# Patient Record
Sex: MALE | Race: WHITE | NOT HISPANIC OR LATINO | Employment: FULL TIME | URBAN - METROPOLITAN AREA
[De-identification: names, ages, dates, MRNs, and addresses within clinical notes are randomized per-mention and may not be internally consistent; named-entity substitution may affect disease eponyms.]

---

## 2022-04-09 ENCOUNTER — HISTORICAL (OUTPATIENT)
Dept: ADMINISTRATIVE | Facility: HOSPITAL | Age: 52
End: 2022-04-09

## 2022-04-26 VITALS
SYSTOLIC BLOOD PRESSURE: 140 MMHG | BODY MASS INDEX: 31.21 KG/M2 | WEIGHT: 210.75 LBS | DIASTOLIC BLOOD PRESSURE: 83 MMHG | HEIGHT: 69 IN | OXYGEN SATURATION: 96 %

## 2022-05-26 ENCOUNTER — OFFICE VISIT (OUTPATIENT)
Dept: URGENT CARE | Facility: CLINIC | Age: 52
End: 2022-05-26
Payer: COMMERCIAL

## 2022-05-26 VITALS
SYSTOLIC BLOOD PRESSURE: 141 MMHG | DIASTOLIC BLOOD PRESSURE: 87 MMHG | TEMPERATURE: 98 F | BODY MASS INDEX: 32.62 KG/M2 | RESPIRATION RATE: 20 BRPM | OXYGEN SATURATION: 98 % | HEIGHT: 66 IN | HEART RATE: 85 BPM | WEIGHT: 203 LBS

## 2022-05-26 DIAGNOSIS — T16.2XXA FOREIGN BODY OF LEFT EAR, INITIAL ENCOUNTER: ICD-10-CM

## 2022-05-26 PROCEDURE — 99202 OFFICE O/P NEW SF 15 MIN: CPT | Mod: ,,, | Performed by: NURSE PRACTITIONER

## 2022-05-26 PROCEDURE — 99202 PR OFFICE/OUTPT VISIT, NEW, LEVL II, 15-29 MIN: ICD-10-PCS | Mod: ,,, | Performed by: NURSE PRACTITIONER

## 2022-05-26 PROCEDURE — 3079F PR MOST RECENT DIASTOLIC BLOOD PRESSURE 80-89 MM HG: ICD-10-PCS | Mod: CPTII,,, | Performed by: NURSE PRACTITIONER

## 2022-05-26 PROCEDURE — 3079F DIAST BP 80-89 MM HG: CPT | Mod: CPTII,,, | Performed by: NURSE PRACTITIONER

## 2022-05-26 PROCEDURE — 1159F MED LIST DOCD IN RCRD: CPT | Mod: CPTII,,, | Performed by: NURSE PRACTITIONER

## 2022-05-26 PROCEDURE — 1160F PR REVIEW ALL MEDS BY PRESCRIBER/CLIN PHARMACIST DOCUMENTED: ICD-10-PCS | Mod: CPTII,,, | Performed by: NURSE PRACTITIONER

## 2022-05-26 PROCEDURE — 1160F RVW MEDS BY RX/DR IN RCRD: CPT | Mod: CPTII,,, | Performed by: NURSE PRACTITIONER

## 2022-05-26 PROCEDURE — 3008F BODY MASS INDEX DOCD: CPT | Mod: CPTII,,, | Performed by: NURSE PRACTITIONER

## 2022-05-26 PROCEDURE — 3008F PR BODY MASS INDEX (BMI) DOCUMENTED: ICD-10-PCS | Mod: CPTII,,, | Performed by: NURSE PRACTITIONER

## 2022-05-26 PROCEDURE — 3077F SYST BP >= 140 MM HG: CPT | Mod: CPTII,,, | Performed by: NURSE PRACTITIONER

## 2022-05-26 PROCEDURE — 3077F PR MOST RECENT SYSTOLIC BLOOD PRESSURE >= 140 MM HG: ICD-10-PCS | Mod: CPTII,,, | Performed by: NURSE PRACTITIONER

## 2022-05-26 PROCEDURE — 1159F PR MEDICATION LIST DOCUMENTED IN MEDICAL RECORD: ICD-10-PCS | Mod: CPTII,,, | Performed by: NURSE PRACTITIONER

## 2023-06-12 ENCOUNTER — OFFICE VISIT (OUTPATIENT)
Dept: URGENT CARE | Facility: CLINIC | Age: 53
End: 2023-06-12
Payer: COMMERCIAL

## 2023-06-12 VITALS
HEIGHT: 66 IN | DIASTOLIC BLOOD PRESSURE: 79 MMHG | TEMPERATURE: 99 F | BODY MASS INDEX: 31.82 KG/M2 | SYSTOLIC BLOOD PRESSURE: 129 MMHG | HEART RATE: 85 BPM | OXYGEN SATURATION: 96 % | WEIGHT: 198 LBS | RESPIRATION RATE: 18 BRPM

## 2023-06-12 DIAGNOSIS — J02.9 SORE THROAT: Primary | ICD-10-CM

## 2023-06-12 DIAGNOSIS — J10.1 INFLUENZA A: ICD-10-CM

## 2023-06-12 DIAGNOSIS — R50.9 FEVER, UNSPECIFIED FEVER CAUSE: ICD-10-CM

## 2023-06-12 LAB
CTP QC/QA: YES
MOLECULAR STREP A: NEGATIVE
POC MOLECULAR INFLUENZA A AGN: POSITIVE
POC MOLECULAR INFLUENZA B AGN: NEGATIVE
SARS-COV-2 RDRP RESP QL NAA+PROBE: NEGATIVE

## 2023-06-12 PROCEDURE — 99213 PR OFFICE/OUTPT VISIT, EST, LEVL III, 20-29 MIN: ICD-10-PCS | Mod: ,,,

## 2023-06-12 PROCEDURE — 99213 OFFICE O/P EST LOW 20 MIN: CPT | Mod: ,,,

## 2023-06-12 PROCEDURE — 87502 INFLUENZA DNA AMP PROBE: CPT | Mod: QW,,,

## 2023-06-12 PROCEDURE — 87635 SARS-COV-2 COVID-19 AMP PRB: CPT | Mod: QW,,,

## 2023-06-12 PROCEDURE — 87651 STREP A DNA AMP PROBE: CPT | Mod: QW,,,

## 2023-06-12 PROCEDURE — 87502 POCT INFLUENZA A/B MOLECULAR: ICD-10-PCS | Mod: QW,,,

## 2023-06-12 PROCEDURE — 87651 POCT STREP A MOLECULAR: ICD-10-PCS | Mod: QW,,,

## 2023-06-12 PROCEDURE — 87635: ICD-10-PCS | Mod: QW,,,

## 2023-06-12 RX ORDER — BALOXAVIR MARBOXIL 80 MG/1
80 TABLET, FILM COATED ORAL ONCE
Qty: 1 TABLET | Refills: 0 | Status: SHIPPED | OUTPATIENT
Start: 2023-06-12 | End: 2023-06-12

## 2023-06-12 NOTE — PATIENT INSTRUCTIONS
Prescriptions printed  Increase fluid intake. Monitor for fever. Take tylenol/acetaminophen or advil/ibuprofen as needed for headache, bodyaches or fever.   Treat your symptoms like the common cold, take Delysm/dimetapp/robitussin as needed for cough, claritin, flonase, mucinex for congestion, for example.   Complications for flu include pneumonia, bronchitis, and sinusitis.   Stay home for 5 to 7 days total starting from when your symptoms began.  If your symptoms worsen, or you develop shortness of breath, worsening of cough, or fever over 102.5, seek medical attention immediately.

## 2023-06-12 NOTE — LETTER
June 12, 2023      [facility]  [address], [address]  [place] [address]  Phone: [phone]  Fax: [phone]       Patient: Terry Machado   YOB: 1970  Date of Visit: 06/12/2023    To Whom It May Concern:    Iraida Machado  was at [facility] on 06/12/2023. The patient may return to work/school on 06/19/2023 with no restrictions. If you have any questions or concerns, or if I can be of further assistance, please do not hesitate to contact me.    Sincerely,    Shanice Bearden, RT

## 2023-06-12 NOTE — PROGRESS NOTES
"Subjective:      Patient ID: Terry Machado is a 52 y.o. male.    Vitals:  height is 5' 6" (1.676 m) and weight is 89.8 kg (198 lb). His temperature is 98.5 °F (36.9 °C). His blood pressure is 129/79 and his pulse is 85. His respiration is 18 and oxygen saturation is 96%.     Chief Complaint: Sore Throat (Sore throat, dry cough, fever (101.7), x since yesterday)    A 51 y/o male presents to the clinic with c/o sudden onset of fatigue, sore throat, dry cough, body aches, fever tmax 101.7, and chills x 2 days. He denies any hx of asthma, wheezing, sob, cp, n/v/d, abdominal complaints, rash, difficulty swallowing, neck stiffness, or changes in intake or output.       Sore Throat   Associated symptoms include congestion and coughing. Pertinent negatives include no shortness of breath or trouble swallowing.     Constitution: Positive for fatigue and fever.   HENT:  Positive for congestion, postnasal drip and sore throat. Negative for trouble swallowing and voice change.    Eyes: Negative.    Respiratory:  Positive for cough. Negative for sputum production, shortness of breath, wheezing and asthma.    Gastrointestinal: Negative.    Allergic/Immunologic: Negative for asthma.    Objective:     Physical Exam   Constitutional: He is oriented to person, place, and time. He appears well-developed. He is cooperative.  Non-toxic appearance. He does not appear ill. No distress.   HENT:   Head: Normocephalic and atraumatic.   Ears:   Right Ear: Hearing, tympanic membrane and external ear normal.   Left Ear: Hearing, tympanic membrane and external ear normal.   Nose: Congestion present.   Mouth/Throat: Mucous membranes are normal. Mucous membranes are moist. Posterior oropharyngeal erythema present. Oropharynx is clear.   Eyes: Conjunctivae and lids are normal.   Neck: Trachea normal and phonation normal. Neck supple. No edema present. No erythema present. No neck rigidity present.   Cardiovascular: Normal rate, regular rhythm and " "normal heart sounds.   Pulmonary/Chest: Effort normal and breath sounds normal. No stridor. No respiratory distress. He has no decreased breath sounds. He has no wheezes. He has no rhonchi. He has no rales.   Abdominal: Normal appearance.   Neurological: He is alert and oriented to person, place, and time. He exhibits normal muscle tone.   Skin: Skin is warm, intact and not diaphoretic. Capillary refill takes less than 2 seconds.   Psychiatric: His speech is normal and behavior is normal. Mood normal.   Nursing note and vitals reviewed.       Previous History      Review of patient's allergies indicates:  No Known Allergies    Past Medical History:   Diagnosis Date    Unspecified sensorineural hearing loss      Current Outpatient Medications   Medication Instructions    XOFLUZA 80 mg, Oral, Once     Past Surgical History:   Procedure Laterality Date    FOOT SURGERY       Family History   Problem Relation Age of Onset    Diabetes Mellitus Mother     Diabetes type II Father        Social History     Tobacco Use    Smoking status: Never    Smokeless tobacco: Former   Substance Use Topics    Alcohol use: Yes     Comment: occas    Drug use: Never        Physical Exam      Vital Signs Reviewed   /79   Pulse 85   Temp 98.5 °F (36.9 °C)   Resp 18   Ht 5' 6" (1.676 m)   Wt 89.8 kg (198 lb)   SpO2 96%   BMI 31.96 kg/m²        Procedures    Procedures     Labs     Results for orders placed or performed in visit on 06/12/23   POCT Strep A, Molecular   Result Value Ref Range    Molecular Strep A, POC Negative Negative     Acceptable Yes    POCT COVID-19 Rapid Screening   Result Value Ref Range    POC Rapid COVID Negative Negative     Acceptable Yes    POCT Influenza A/B Molecular   Result Value Ref Range    POC Molecular Influenza A Ag Positive (A) Negative, Not Reported    POC Molecular Influenza B Ag Negative Negative, Not Reported     Acceptable Yes        Assessment: "     1. Sore throat    2. Fever, unspecified fever cause    3. Influenza A        Plan:       Sore throat  -     POCT Strep A, Molecular  -     POCT COVID-19 Rapid Screening  -     POCT Influenza A/B Molecular    Fever, unspecified fever cause  -     POCT Strep A, Molecular  -     POCT COVID-19 Rapid Screening  -     POCT Influenza A/B Molecular    Influenza A    Other orders  -     baloxavir marboxiL (XOFLUZA) 80 mg tablet; Take 1 tablet (80 mg total) by mouth once. for 1 dose  Dispense: 1 tablet; Refill: 0      Prescriptions printed  Increase fluid intake. Monitor for fever. Take tylenol/acetaminophen or advil/ibuprofen as needed for headache, bodyaches or fever.   Treat your symptoms like the common cold, take Delysm/dimetapp/robitussin as needed for cough, claritin, flonase, mucinex for congestion, for example.   Complications for flu include pneumonia, bronchitis, and sinusitis.   Stay home for 5 to 7 days total starting from when your symptoms began.  If your symptoms worsen, or you develop shortness of breath, worsening of cough, or fever over 102.5, seek medical attention immediately.

## 2023-08-30 ENCOUNTER — HOSPITAL ENCOUNTER (OUTPATIENT)
Facility: HOSPITAL | Age: 53
Discharge: HOME | End: 2023-08-31
Attending: EMERGENCY MEDICINE | Admitting: INTERNAL MEDICINE
Payer: COMMERCIAL

## 2023-08-30 DIAGNOSIS — R07.9 CHEST PAIN WITH MODERATE RISK OF ACUTE CORONARY SYNDROME: ICD-10-CM

## 2023-08-30 DIAGNOSIS — R07.9 CHEST PAIN: ICD-10-CM

## 2023-08-30 LAB
ALBUMIN SERPL-MCNC: 4.4 G/DL (ref 3.5–5)
ALBUMIN/GLOB SERPL: 1.4 RATIO (ref 1.1–2)
ALP SERPL-CCNC: 61 UNIT/L (ref 40–150)
ALT SERPL-CCNC: 41 UNIT/L (ref 0–55)
APPEARANCE UR: ABNORMAL
AST SERPL-CCNC: 26 UNIT/L (ref 5–34)
BACTERIA #/AREA URNS AUTO: NORMAL /HPF
BASOPHILS # BLD AUTO: 0.05 X10(3)/MCL
BASOPHILS NFR BLD AUTO: 0.6 %
BILIRUB SERPL-MCNC: 0.5 MG/DL
BILIRUB UR QL STRIP.AUTO: NEGATIVE
BUN SERPL-MCNC: 16.8 MG/DL (ref 8.4–25.7)
CALCIUM SERPL-MCNC: 9.6 MG/DL (ref 8.4–10.2)
CHLORIDE SERPL-SCNC: 108 MMOL/L (ref 98–107)
CO2 SERPL-SCNC: 24 MMOL/L (ref 22–29)
COLOR UR: ABNORMAL
CREAT SERPL-MCNC: 0.85 MG/DL (ref 0.73–1.18)
EOSINOPHIL # BLD AUTO: 0.26 X10(3)/MCL (ref 0–0.9)
EOSINOPHIL NFR BLD AUTO: 2.9 %
ERYTHROCYTE [DISTWIDTH] IN BLOOD BY AUTOMATED COUNT: 12.9 % (ref 11.5–17)
FLUAV AG UPPER RESP QL IA.RAPID: NOT DETECTED
FLUBV AG UPPER RESP QL IA.RAPID: NOT DETECTED
GFR SERPLBLD CREATININE-BSD FMLA CKD-EPI: >60 MLS/MIN/1.73/M2
GLOBULIN SER-MCNC: 3.2 GM/DL (ref 2.4–3.5)
GLUCOSE SERPL-MCNC: 105 MG/DL (ref 74–100)
GLUCOSE UR QL STRIP.AUTO: NEGATIVE
HCT VFR BLD AUTO: 42.4 % (ref 42–52)
HGB BLD-MCNC: 14.7 G/DL (ref 14–18)
IMM GRANULOCYTES # BLD AUTO: 0.02 X10(3)/MCL (ref 0–0.04)
IMM GRANULOCYTES NFR BLD AUTO: 0.2 %
INR PPP: 1
KETONES UR QL STRIP.AUTO: NEGATIVE
LEUKOCYTE ESTERASE UR QL STRIP.AUTO: NEGATIVE
LIPASE SERPL-CCNC: 24 U/L
LYMPHOCYTES # BLD AUTO: 3.09 X10(3)/MCL (ref 0.6–4.6)
LYMPHOCYTES NFR BLD AUTO: 34.1 %
MCH RBC QN AUTO: 31.3 PG (ref 27–31)
MCHC RBC AUTO-ENTMCNC: 34.7 G/DL (ref 33–36)
MCV RBC AUTO: 90.2 FL (ref 80–94)
MONOCYTES # BLD AUTO: 0.67 X10(3)/MCL (ref 0.1–1.3)
MONOCYTES NFR BLD AUTO: 7.4 %
NEUTROPHILS # BLD AUTO: 4.98 X10(3)/MCL (ref 2.1–9.2)
NEUTROPHILS NFR BLD AUTO: 54.8 %
NITRITE UR QL STRIP.AUTO: NEGATIVE
NRBC BLD AUTO-RTO: 0 %
PH UR STRIP.AUTO: 7.5 [PH]
PLATELET # BLD AUTO: 295 X10(3)/MCL (ref 130–400)
PMV BLD AUTO: 10.2 FL (ref 7.4–10.4)
POTASSIUM SERPL-SCNC: 3.9 MMOL/L (ref 3.5–5.1)
PROT SERPL-MCNC: 7.6 GM/DL (ref 6.4–8.3)
PROT UR QL STRIP.AUTO: NEGATIVE
PROTHROMBIN TIME: 13.4 SECONDS (ref 12.5–14.5)
RBC # BLD AUTO: 4.7 X10(6)/MCL (ref 4.7–6.1)
RBC #/AREA URNS AUTO: <5 /HPF
RBC UR QL AUTO: NEGATIVE
SARS-COV-2 RNA RESP QL NAA+PROBE: NOT DETECTED
SODIUM SERPL-SCNC: 141 MMOL/L (ref 136–145)
SP GR UR STRIP.AUTO: 1.02 (ref 1–1.03)
SQUAMOUS #/AREA URNS AUTO: <5 /HPF
TROPONIN I SERPL-MCNC: <0.01 NG/ML (ref 0–0.04)
UROBILINOGEN UR STRIP-ACNC: 1
WBC # SPEC AUTO: 9.07 X10(3)/MCL (ref 4.5–11.5)
WBC #/AREA URNS AUTO: <5 /HPF

## 2023-08-30 PROCEDURE — 85610 PROTHROMBIN TIME: CPT

## 2023-08-30 PROCEDURE — 93005 ELECTROCARDIOGRAM TRACING: CPT

## 2023-08-30 PROCEDURE — 93010 ELECTROCARDIOGRAM REPORT: CPT | Mod: ,,, | Performed by: INTERNAL MEDICINE

## 2023-08-30 PROCEDURE — 80053 COMPREHEN METABOLIC PANEL: CPT

## 2023-08-30 PROCEDURE — 25000003 PHARM REV CODE 250: Performed by: EMERGENCY MEDICINE

## 2023-08-30 PROCEDURE — 85025 COMPLETE CBC W/AUTO DIFF WBC: CPT

## 2023-08-30 PROCEDURE — 84484 ASSAY OF TROPONIN QUANT: CPT

## 2023-08-30 PROCEDURE — 0240U COVID/FLU A&B PCR: CPT

## 2023-08-30 PROCEDURE — 84484 ASSAY OF TROPONIN QUANT: CPT | Mod: 91 | Performed by: EMERGENCY MEDICINE

## 2023-08-30 PROCEDURE — 83690 ASSAY OF LIPASE: CPT

## 2023-08-30 PROCEDURE — 63600175 PHARM REV CODE 636 W HCPCS: Performed by: EMERGENCY MEDICINE

## 2023-08-30 PROCEDURE — C9113 INJ PANTOPRAZOLE SODIUM, VIA: HCPCS | Performed by: EMERGENCY MEDICINE

## 2023-08-30 PROCEDURE — 93010 EKG 12-LEAD: ICD-10-PCS | Mod: ,,, | Performed by: INTERNAL MEDICINE

## 2023-08-30 PROCEDURE — 81001 URINALYSIS AUTO W/SCOPE: CPT

## 2023-08-30 PROCEDURE — 99285 EMERGENCY DEPT VISIT HI MDM: CPT | Mod: 25

## 2023-08-30 PROCEDURE — 96374 THER/PROPH/DIAG INJ IV PUSH: CPT

## 2023-08-30 RX ORDER — ONDANSETRON 2 MG/ML
4 INJECTION INTRAMUSCULAR; INTRAVENOUS EVERY 8 HOURS PRN
Status: DISCONTINUED | OUTPATIENT
Start: 2023-08-31 | End: 2023-08-31 | Stop reason: HOSPADM

## 2023-08-30 RX ORDER — PANTOPRAZOLE SODIUM 40 MG/10ML
80 INJECTION, POWDER, LYOPHILIZED, FOR SOLUTION INTRAVENOUS
Status: COMPLETED | OUTPATIENT
Start: 2023-08-30 | End: 2023-08-30

## 2023-08-30 RX ORDER — SUCRALFATE 1 G/1
1 TABLET ORAL
Status: COMPLETED | OUTPATIENT
Start: 2023-08-30 | End: 2023-08-30

## 2023-08-30 RX ORDER — NITROGLYCERIN 0.4 MG/1
0.4 TABLET SUBLINGUAL EVERY 5 MIN PRN
Status: DISCONTINUED | OUTPATIENT
Start: 2023-08-31 | End: 2023-08-31 | Stop reason: HOSPADM

## 2023-08-30 RX ORDER — ASPIRIN 325 MG
325 TABLET, DELAYED RELEASE (ENTERIC COATED) ORAL
Status: COMPLETED | OUTPATIENT
Start: 2023-08-30 | End: 2023-08-31

## 2023-08-30 RX ORDER — TALC
6 POWDER (GRAM) TOPICAL NIGHTLY PRN
Status: DISCONTINUED | OUTPATIENT
Start: 2023-08-31 | End: 2023-08-31 | Stop reason: HOSPADM

## 2023-08-30 RX ORDER — SODIUM CHLORIDE 0.9 % (FLUSH) 0.9 %
10 SYRINGE (ML) INJECTION
Status: DISCONTINUED | OUTPATIENT
Start: 2023-08-31 | End: 2023-08-31 | Stop reason: HOSPADM

## 2023-08-30 RX ADMIN — PANTOPRAZOLE SODIUM 80 MG: 40 INJECTION, POWDER, LYOPHILIZED, FOR SOLUTION INTRAVENOUS at 09:08

## 2023-08-30 RX ADMIN — SUCRALFATE 1 G: 1 TABLET ORAL at 09:08

## 2023-08-30 NOTE — Clinical Note
"Terry Franco" Carter was seen and treated in our emergency department on 8/30/2023.  He may return to work on 09/01/2023.       If you have any questions or concerns, please don't hesitate to call.       RN    "

## 2023-08-31 VITALS
RESPIRATION RATE: 19 BRPM | SYSTOLIC BLOOD PRESSURE: 135 MMHG | OXYGEN SATURATION: 99 % | DIASTOLIC BLOOD PRESSURE: 78 MMHG | WEIGHT: 195 LBS | TEMPERATURE: 98 F | BODY MASS INDEX: 31.47 KG/M2 | HEART RATE: 62 BPM

## 2023-08-31 PROBLEM — R07.9 CHEST PAIN WITH MODERATE RISK OF ACUTE CORONARY SYNDROME: Status: ACTIVE | Noted: 2023-08-31

## 2023-08-31 LAB
TROPONIN I SERPL-MCNC: <0.01 NG/ML (ref 0–0.04)
TROPONIN I SERPL-MCNC: <0.01 NG/ML (ref 0–0.04)

## 2023-08-31 PROCEDURE — G0378 HOSPITAL OBSERVATION PER HR: HCPCS

## 2023-08-31 PROCEDURE — 25000003 PHARM REV CODE 250: Performed by: EMERGENCY MEDICINE

## 2023-08-31 PROCEDURE — 84484 ASSAY OF TROPONIN QUANT: CPT | Performed by: EMERGENCY MEDICINE

## 2023-08-31 RX ORDER — NITROGLYCERIN 0.4 MG/1
0.4 TABLET SUBLINGUAL EVERY 5 MIN PRN
Qty: 3 TABLET | Refills: 3 | Status: SHIPPED | OUTPATIENT
Start: 2023-08-31 | End: 2024-08-30

## 2023-08-31 RX ADMIN — ASPIRIN 325 MG: 325 TABLET, COATED ORAL at 12:08

## 2023-08-31 NOTE — DISCHARGE SUMMARY
[facility] - Emergency Dept  Short Stay Discharge Summary    Cardiology      OUTCOME: Condition has improved and patient is now ready for discharge.    DISPOSITION: Home or Self Care    FINAL DIAGNOSIS:  Non-cardiac CP    FOLLOWUP: In clinic    DISCHARGE INSTRUCTIONS:   Discharge Procedure Orders   Diet Cardiac     Notify your health care provider if you experience any of the following:  temperature >100.4     Notify your health care provider if you experience any of the following:  persistent nausea and vomiting or diarrhea     Notify your health care provider if you experience any of the following:  severe uncontrolled pain     Notify your health care provider if you experience any of the following:  redness, tenderness, or signs of infection (pain, swelling, redness, odor or green/yellow discharge around incision site)     Activity as tolerated       TIME SPENT ON DISCHARGE: 35 minutes

## 2023-08-31 NOTE — ED PROVIDER NOTES
Encounter Date: 8/30/2023    SCRIBE #1 NOTE: I, Chinedu Rogers, am scribing for, and in the presence of,  Alda Stevenson MD. I have scribed the following portions of the note - Other sections scribed: HPI, ROS, PE.       History     Chief Complaint   Patient presents with    Chest Pain     Midsternal CP that started an hour ago with nausea and one episode of vomiting with dark red blood, reports sob that gotten better. No cardiac hx. No daily medications.     53 year old male presents to ED c/o chest pain onset just PTA. 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 Pt reports that his chest pain worsened when vomiting, but has currently resolved. Pt reports that he now has a headache. Pt states that his food intake has decreased over the past two weeks, as he has been working nights for the past 2 weeks, last food intake was boiled shrimp at 1500. Pt denies abdominal pain, regular OTC pain meds, smoking, quit smokeless tobacco about 4 months ago, and illicit drugs. Pt reports that he occasionally drinks ETOH, last intake was 200 ml of liquor last weekend. Pt reports that he has a colon perforation from diverticulitis 4 years ago, has follow up with GI next year.     The history is provided by the patient. No  was used.   Chest Pain  The current episode started just prior to arrival. Episode frequency: once. The chest pain is resolved. Primary symptoms include nausea, vomiting and dizziness. Pertinent negatives for primary symptoms include no abdominal pain.   The emesis contains bilious material.   Dizziness also occurs with nausea, vomiting and diaphoresis.   Associated symptoms include diaphoresis. Risk factors include male gender.     Review of patient's allergies indicates:  No Known Allergies  Past Medical History:   Diagnosis Date    Unspecified  sensorineural hearing loss      Past Surgical History:   Procedure Laterality Date    FOOT SURGERY       Family History   Problem Relation Age of Onset    Diabetes Mellitus Mother     Diabetes type II Father      Social History     Tobacco Use    Smoking status: Never    Smokeless tobacco: Former   Substance Use Topics    Alcohol use: Yes     Comment: occas    Drug use: Never     Review of Systems   Constitutional:  Positive for diaphoresis.   Cardiovascular:  Positive for chest pain.   Gastrointestinal:  Positive for nausea and vomiting. Negative for abdominal pain.   Neurological:  Positive for dizziness.       Physical Exam     Initial Vitals [08/30/23 1909]   BP Pulse Resp Temp SpO2   (!) 164/85 88 20 99.1 °F (37.3 °C) 98 %      MAP       --         Physical Exam    Constitutional: He appears well-developed and well-nourished. No distress.   HENT:   Head: Normocephalic and atraumatic.   Mouth/Throat: Oropharynx is clear and moist.   Eyes: Conjunctivae are normal. Pupils are equal, round, and reactive to light.   Neck: Neck supple.   Normal range of motion.  Cardiovascular:  Normal rate, regular rhythm and normal heart sounds.           No murmur heard.  Pulmonary/Chest: Breath sounds normal. No respiratory distress.   Abdominal: Abdomen is soft. He exhibits no distension. There is no abdominal tenderness.   Musculoskeletal:         General: Normal range of motion.      Cervical back: Normal range of motion and neck supple.     Neurological: He is alert and oriented to person, place, and time. GCS score is 15. GCS eye subscore is 4. GCS verbal subscore is 5. GCS motor subscore is 6.   Skin: Skin is warm and dry. No rash noted.   Psychiatric: He has a normal mood and affect.         ED Course   Procedures  Labs Reviewed   COMPREHENSIVE METABOLIC PANEL - Abnormal; Notable for the following components:       Result Value    Chloride 108 (*)     Glucose Level 105 (*)     All other components within normal limits    URINALYSIS, REFLEX TO URINE CULTURE - Abnormal; Notable for the following components:    Appearance, UA Turbid (*)     All other components within normal limits   CBC WITH DIFFERENTIAL - Abnormal; Notable for the following components:    MCH 31.3 (*)     All other components within normal limits   LIPASE - Normal   TROPONIN I - Normal   PROTIME-INR - Normal   COVID/FLU A&B PCR - Normal    Narrative:     The Xpert Xpress SARS-CoV-2/FLU/RSV plus is a rapid, multiplexed real-time PCR test intended for the simultaneous qualitative detection and differentiation of SARS-CoV-2, Influenza A, Influenza B, and respiratory syncytial virus (RSV) viral RNA in either nasopharyngeal swab or nasal swab specimens.         URINALYSIS, MICROSCOPIC - Normal   TROPONIN I - Normal   CBC W/ AUTO DIFFERENTIAL    Narrative:     The following orders were created for panel order CBC auto differential.  Procedure                               Abnormality         Status                     ---------                               -----------         ------                     CBC with Differential[[id number]]        Abnormal            Final result                 Please view results for these tests on the individual orders.   TROPONIN I          Imaging Results              X-Ray Chest AP Portable (Final result)  Result time 08/30/23 19:24:17      Final result by Re Marinelli MD (08/30/23 19:24:17)                   Impression:      No acute cardiopulmonary abnormality.      Electronically signed by: Re Marinelli  Date:    08/30/2023  Time:    19:24               Narrative:    EXAMINATION:  XR CHEST AP PORTABLE    CLINICAL HISTORY:  chest pain;    TECHNIQUE:  Single frontal view of the chest was performed.    COMPARISON:  None    FINDINGS:  LINES AND TUBES: None    MEDIASTINUM AND DEBORA: The cardiac silhouette is normal.    LUNGS: No lobar consolidation. No edema.    PLEURA:No pleural effusion. No pneumothorax.    BONES: No acute  osseous abnormality.                                       Medications   sodium chloride 0.9% flush 10 mL (has no administration in time range)   melatonin tablet 6 mg (has no administration in time range)   ondansetron injection 4 mg (has no administration in time range)   nitroGLYCERIN SL tablet 0.4 mg (has no administration in time range)   pantoprazole injection 80 mg (80 mg Intravenous Given 8/30/23 2135)   sucralfate tablet 1 g (1 g Oral Given 8/30/23 2134)   aspirin EC tablet 325 mg (325 mg Oral Given 8/31/23 0001)   Medical Decision Making  Problems Addressed:  Chest pain with moderate risk of acute coronary syndrome: acute illness or injury that poses a threat to life or bodily functions    Risk  OTC drugs.  Prescription drug management.                Scribe Attestation:   Scribe #1: I performed the above scribed service and the documentation accurately describes the services I performed. I attest to the accuracy of the note.    Attending Attestation:           Physician Attestation for Scribe:  Physician Attestation Statement for Scribe #1: I, Alda Stevenson MD, reviewed documentation, as scribed by Chinedu Rogers in my presence, and it is both accurate and complete.           ED assessment:    Mr. Machado presented to the emergency room for evaluation of acute onset chest pressure, shortness of breath, diaphoresis, nausea and vomiting.  He reported that after violently virtual, there was a small amount of blood-streaked emesis.  Denies any hematochezia or melena.  No longer nauseated.    Differential diagnosis (including but not limited to):   Esophageal spasm, gastritis, esophagitis, PUD, acute coronary syndrome, unstable angina, anxiety, GI hemorrhage, acute blood loss anemia    ED management:   See ED course below    My independent radiology interpretation:   Chest x-ray:  No focal infiltrate or effusion, no subdiaphragmatic free air    Amount and/or Complexity of Data Reviewed  Independent  historian: none   Summary of history:   External data reviewed: no prior records available for review   Summary of data reviewed:   Risk and benefits of testing: discussed   Labs: ordered and reviewed  Radiology: ordered and independent interpretation performed (see above or ED course)  ECG/medicine tests: ordered and independent interpretation performed (see above or ED course)  Discussion of management or test interpretation with external provider(s): discussed with cardiology consultant   Summary of discussion: as below    Risk  Decision regarding hospitalization  Shared decision making     Critical Care  none    I, Alda Stevenson MD personally performed the history, PE, MDM, and procedures as documented above and agree with the scribe's documentation.        ED Course as of 08/31/23 3281   Wed Aug 30, 2023   2158 Initial EKG with inferolateral T-wave inversions, no ST elevation.  Repeat at 2-1/2 hours from ED presentation, presently asymptomatic, previous T-wave inversions in V4, V5, II have resolved, still with some nonspecific T-wave changes in III, AVF and V6.  No ST elevation. [KS]   2300 Paged [facility] [BG]   2307 Discussed with patient, symptoms remained resolved.  Given dynamic changes on EKG between his symptomatic time and asymptomatic time, will plan for observation, repeat EKG in morning, trend cardiac enzymes. [KS]   1396 Case discussed with Tree Vila NP with [facility] who agrees w/ observation admission.  [KS]      ED Course User Index  [BG] Chinedu Rogers  [KS] Alda Stevenson MD                    Clinical Impression:   Final diagnoses:  [R07.9] Chest pain  [R07.9] Chest pain with moderate risk of acute coronary syndrome        ED Disposition Condition    Observation                 Alda Stevenson MD  08/31/23 4398

## 2023-08-31 NOTE — FIRST PROVIDER EVALUATION
Medical screening examination initiated.  I have conducted a focused provider triage encounter, findings are as follows:    Brief history of present illness:  53-year-old male presents to ER with complaint of midsternal chest pain that started 1 hour ago.  He states that he also had nausea had 1 episode of vomiting dark red blood.  Patient denies cardiac history.    Vitals:    08/30/23 1909   BP: (!) 164/85   Pulse: 88   Resp: 20   Temp: 99.1 °F (37.3 °C)   TempSrc: Oral   SpO2: 98%   Weight: 88.5 kg (195 lb)       Pertinent physical exam:  Awake and alert, nad.    Brief workup plan:  Labs, ekg, cxr    Preliminary workup initiated; this workup will be continued and followed by the physician or advanced practice provider that is assigned to the patient when roomed.

## 2023-08-31 NOTE — H&P
[facility] - Emergency Dept    Cardiology  History and Physical     Patient Name: Terry Machado  MRN: [id number]  Admission Date: 8/30/2023  Code Status: Full Code   Attending Provider: Bill Benitez MD   Primary Care Physician: Pavithra, Primary Doctor  Principal Problem:<principal problem not specified>    Patient information was obtained from parent, past medical records, and ER records.     Subjective:     Chief Complaint:  CP    HPI:   53-year-old male that is unknown to [facility] with PMHx of diverticulitis. Presented to ED on 8.30.23 with complaints of CP. 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 He also admits to vomiting bile & one episode of vomiting dark red color (? Blood) and decreased food intake for the last 2 weeks. His last food intake was boiled shrimp at 1500 on 8.31.23. Patient denies any previous cardiology care. No exertional CP, some HORTON on the stairs.  WBC 9.07, H/H 14.7/42.4, , K 3.9, Cr 0.85, troponin <0.010 x2, EKG initial showed T wave abnormalities that resolved. CXR shows no acute findings. Patient currently denies SOB, CP, or nausea. Patient was admitted to [facility] services for ACS rule out.  He has been able to eat a meal overnight and felt fine with this.        PMH:   Diverticulitis, hearing loss  PSH:   Colon perforation , foot surgery  Family History:   Mother: DM, Father: DM   Social History:   Former tobacco use, denies ETOH or illict drug use.     Previous Cardiac Diagnostics:   None        Review of patient's allergies indicates:  No Known Allergies    No current facility-administered medications on file prior to encounter.     No current outpatient medications on file prior to encounter.           Review of Systems   Constitutional: Negative for decreased appetite and fever.        + weakness   HENT:  Negative for congestion.    Cardiovascular:  Negative for chest pain and  "palpitations.   Respiratory:  Positive for shortness of breath. Negative for cough.    Skin:  Negative for nail changes.   Gastrointestinal:  Positive for vomiting. Negative for abdominal pain.   Psychiatric/Behavioral:  Negative for altered mental status.      Objective:     Vital Signs (Most Recent):  Temp: 99.1 °F (37.3 °C) (08/30/23 1909)  Pulse: (!) 51 (08/31/23 0700)  Resp: 15 (08/31/23 0700)  BP: 109/61 (08/31/23 0700)  SpO2: 97 % (08/31/23 0700) Vital Signs (24h Range):  Temp:  [99.1 °F (37.3 °C)] 99.1 °F (37.3 °C)  Pulse:  [49-88] 51  Resp:  [9-22] 15  SpO2:  [96 %-99 %] 97 %  BP: (108-164)/(61-95) 109/61     Weight: 88.5 kg (195 lb)  Body mass index is 31.47 kg/m².    SpO2: 97 %       No intake or output data in the 24 hours ending 08/31/23 0910    Lines/Drains/Airways       Peripheral Intravenous Line  Duration                  Peripheral IV - Single Lumen 08/30/23 2130 20 G Left Antecubital <1 day                    Physical Exam  HENT:      Mouth/Throat:      Mouth: Mucous membranes are moist.   Cardiovascular:      Rate and Rhythm: Normal rate and regular rhythm.      Pulses: Normal pulses.      Heart sounds: Normal heart sounds.   Pulmonary:      Effort: Pulmonary effort is normal.   Abdominal:      General: Abdomen is flat.      Palpations: Abdomen is soft.   Skin:     General: Skin is warm.   Neurological:      Mental Status: He is alert and oriented to person, place, and time.   Psychiatric:         Mood and Affect: Mood normal.         EKG:       Telemetry: NSR    Significant Labs: BMP:   Recent Labs   Lab 08/30/23 2132      K 3.9   CO2 24   BUN 16.8   CREATININE 0.85   CALCIUM 9.6   , CMP   Recent Labs   Lab 08/30/23 2132      K 3.9   CO2 24   BUN 16.8   CREATININE 0.85   CALCIUM 9.6   ALBUMIN 4.4   BILITOT 0.5   ALKPHOS 61   AST 26   ALT 41   , CBC   Recent Labs   Lab 08/30/23 2132   WBC 9.07   HGB 14.7   HCT 42.4      , Lipid Panel No results for input(s): "CHOL", "HDL", " ""LDLCALC", "TRIG", "CHOLHDL" in the last 48 hours., and Troponin   Recent Labs   Lab 08/30/23  2132 08/30/23  2353 08/31/23  0526   TROPONINI <0.010 <0.010 <0.010         Assessment and Plan:   CP   - negative troponin x3   - minimal T wave changes on EKG   - currently denies any CP  Hx of diverticulitis   Former tobacco abuse     PLAN:   ACS ruled out no need for acute ischemic workup   Will defer for outpatient ischemic evaluations         VTE Risk Mitigation (From admission, onward)           Ordered     IP VTE HIGH RISK PATIENT  Once         08/30/23 2346     Place sequential compression device  Until discontinued         08/30/23 2346                    Jessica Bingham NP  Cardiology  [facility] - Emergency Dept  08/31/2023 9:10 AM    See below MDM component performed by me (Carmelo Aragon MD):    CP, atypical for cardiac (? GI issue, but has resolved and he is able to eat)    Trop negx3    EKG with small changes to T waves, non-specific    CP resolved  N/V (? Bleeding before), but H/H is fine and this resolved  Past smoker    Plan:  D/c home  No ASA given possible GI issues recently  Outpt NETT and Ca score then f/u in clinic    "

## 2024-01-25 ENCOUNTER — OFFICE VISIT (OUTPATIENT)
Dept: URGENT CARE | Facility: CLINIC | Age: 54
End: 2024-01-25
Payer: COMMERCIAL

## 2024-01-25 VITALS
DIASTOLIC BLOOD PRESSURE: 80 MMHG | SYSTOLIC BLOOD PRESSURE: 134 MMHG | BODY MASS INDEX: 31.34 KG/M2 | TEMPERATURE: 98 F | OXYGEN SATURATION: 98 % | HEART RATE: 75 BPM | WEIGHT: 195 LBS | HEIGHT: 66 IN | RESPIRATION RATE: 18 BRPM

## 2024-01-25 DIAGNOSIS — H92.09 OTALGIA, UNSPECIFIED LATERALITY: Primary | ICD-10-CM

## 2024-01-25 PROCEDURE — 99203 OFFICE O/P NEW LOW 30 MIN: CPT | Mod: ,,, | Performed by: FAMILY MEDICINE

## 2024-01-25 RX ORDER — ATORVASTATIN CALCIUM 40 MG/1
40 TABLET, FILM COATED ORAL
COMMUNITY
Start: 2024-01-07

## 2024-01-25 RX ORDER — PREDNISONE 10 MG/1
30 TABLET ORAL DAILY
Qty: 15 TABLET | Refills: 0 | Status: SHIPPED | OUTPATIENT
Start: 2024-01-25 | End: 2024-01-30

## 2024-01-25 RX ORDER — AMOXICILLIN AND CLAVULANATE POTASSIUM 875; 125 MG/1; MG/1
1 TABLET, FILM COATED ORAL EVERY 12 HOURS
Qty: 20 TABLET | Refills: 0 | Status: SHIPPED | OUTPATIENT
Start: 2024-01-25 | End: 2024-02-04

## 2024-01-25 NOTE — PATIENT INSTRUCTIONS
Plan:   Mass seen in ear canal longer present.  It was likely wax.  Given that the eardrum is red, we will treat like an ear infection.  Medications sent to pharmacy.  Start Flonase twice a day.  Start Claritin daily.  Tylenol or Advil as needed for pain.  If there is no improvement in symptoms in 2 or 3 days notify our clinic

## 2024-01-25 NOTE — PROGRESS NOTES
"Subjective:      Patient ID: Terry Machado is a 53 y.o. male.    Vitals:  height is 5' 6" (1.676 m) and weight is 88.5 kg (195 lb). His temperature is 98.2 °F (36.8 °C). His blood pressure is 134/80 and his pulse is 75. His respiration is 18 and oxygen saturation is 98%.     Chief Complaint: Otalgia (Pt presents to clinic with c/o intermittent left ear pain starting Sunday after camping. )    53-year-old male presents to clinic complaining of a four-day history of left ear pain.  States it began with some hearing loss and then pain.  States he was at his camp when it started.  States he is concerned it may be an insect perhaps.        Constitution: Negative.   HENT:  Positive for ear pain.    Neck: neck negative.   Cardiovascular: Negative.    Eyes: Negative.    Respiratory: Negative.     Gastrointestinal: Negative.    Genitourinary: Negative.    Musculoskeletal: Negative.    Skin: Negative.    Allergic/Immunologic: Negative.    Neurological: Negative.    Hematologic/Lymphatic: Negative.       Objective:     Physical Exam   Constitutional:  Non-toxic appearance. He does not appear ill. No distress.   HENT:   Head: Normocephalic and atraumatic.   Ears:   Left Ear: impacted cerumen (There is a small black/brown mass the size and shape of a BB sitting in the left ear canal 3/4 to the eardrum.  Likely wax.  TM is erythemic.  We will lavage the ear)  Abdominal: Normal appearance.   Neurological: He is alert.   Skin: Skin is not diaphoretic.   Vitals reviewed.    After lavage, re-evaluation of the left ear revealed that the mass that was seen is no longer present.  It was likely wax.  Eardrum unchanged, erythemic on initial exam.  It is intact.     Previous History      Review of patient's allergies indicates:  No Known Allergies    Past Medical History:   Diagnosis Date    Hyperlipidemia     Unspecified sensorineural hearing loss      Current Outpatient Medications   Medication Instructions    amoxicillin-clavulanate " "875-125mg (AUGMENTIN) 875-125 mg per tablet 1 tablet, Oral, Every 12 hours    atorvastatin (LIPITOR) 40 mg, Oral    nitroGLYCERIN (NITROSTAT) 0.4 mg, Sublingual, Every 5 min PRN    predniSONE (DELTASONE) 30 mg, Oral, Daily     Past Surgical History:   Procedure Laterality Date    FOOT SURGERY       Family History   Problem Relation Age of Onset    Diabetes Mellitus Mother     Diabetes type II Father        Social History     Tobacco Use    Smoking status: Never    Smokeless tobacco: Former   Substance Use Topics    Alcohol use: Yes     Comment: occas    Drug use: Never        Physical Exam      Vital Signs Reviewed   /80   Pulse 75   Temp 98.2 °F (36.8 °C)   Resp 18   Ht 5' 6" (1.676 m)   Wt 88.5 kg (195 lb)   SpO2 98%   BMI 31.47 kg/m²        Procedures    Procedures     Labs     Results for orders placed or performed during the hospital encounter of 08/30/23   Comprehensive metabolic panel   Result Value Ref Range    Sodium Level 141 136 - 145 mmol/L    Potassium Level 3.9 3.5 - 5.1 mmol/L    Chloride 108 (H) 98 - 107 mmol/L    Carbon Dioxide 24 22 - 29 mmol/L    Glucose Level 105 (H) 74 - 100 mg/dL    Blood Urea Nitrogen 16.8 8.4 - 25.7 mg/dL    Creatinine 0.85 0.73 - 1.18 mg/dL    Calcium Level Total 9.6 8.4 - 10.2 mg/dL    Protein Total 7.6 6.4 - 8.3 gm/dL    Albumin Level 4.4 3.5 - 5.0 g/dL    Globulin 3.2 2.4 - 3.5 gm/dL    Albumin/Globulin Ratio 1.4 1.1 - 2.0 ratio    Bilirubin Total 0.5 <=1.5 mg/dL    Alkaline Phosphatase 61 40 - 150 unit/L    Alanine Aminotransferase 41 0 - 55 unit/L    Aspartate Aminotransferase 26 5 - 34 unit/L    eGFR >60 mls/min/1.73/m2   Lipase   Result Value Ref Range    Lipase Level 24 <=60 U/L   Troponin I   Result Value Ref Range    Troponin-I <0.010 0.000 - 0.045 ng/mL   Urinalysis, Reflex to Urine Culture    Specimen: Urine   Result Value Ref Range    Color, UA Dark Yellow Yellow, Light-Yellow, Dark Yellow, Mirta, Straw    Appearance, UA Turbid (A) Clear    Specific " Gravity, UA 1.021 1.005 - 1.030    pH, UA 7.5 5.0 - 8.5    Protein, UA Negative Negative    Glucose, UA Negative Negative, Normal    Ketones, UA Negative Negative    Blood, UA Negative Negative    Bilirubin, UA Negative Negative    Urobilinogen, UA 1.0 0.2, 1.0, Normal    Nitrites, UA Negative Negative    Leukocyte Esterase, UA Negative Negative   Protime-INR   Result Value Ref Range    PT 13.4 12.5 - 14.5 seconds    INR 1.0 <=1.3   CBC with Differential   Result Value Ref Range    WBC 9.07 4.50 - 11.50 x10(3)/mcL    RBC 4.70 4.70 - 6.10 x10(6)/mcL    Hgb 14.7 14.0 - 18.0 g/dL    Hct 42.4 42.0 - 52.0 %    MCV 90.2 80.0 - 94.0 fL    MCH 31.3 (H) 27.0 - 31.0 pg    MCHC 34.7 33.0 - 36.0 g/dL    RDW 12.9 11.5 - 17.0 %    Platelet 295 130 - 400 x10(3)/mcL    MPV 10.2 7.4 - 10.4 fL    Neut % 54.8 %    Lymph % 34.1 %    Mono % 7.4 %    Eos % 2.9 %    Basophil % 0.6 %    Lymph # 3.09 0.6 - 4.6 x10(3)/mcL    Neut # 4.98 2.1 - 9.2 x10(3)/mcL    Mono # 0.67 0.1 - 1.3 x10(3)/mcL    Eos # 0.26 0 - 0.9 x10(3)/mcL    Baso # 0.05 <=0.2 x10(3)/mcL    IG# 0.02 0 - 0.04 x10(3)/mcL    IG% 0.2 %    NRBC% 0.0 %   COVID/FLU A&B PCR   Result Value Ref Range    Influenza A PCR Not Detected Not Detected    Influenza B PCR Not Detected Not Detected    SARS-CoV-2 PCR Not Detected Not Detected, Negative, Invalid   Urinalysis, Microscopic   Result Value Ref Range    RBC, UA <5 <=5 /HPF    WBC, UA <5 <=5 /HPF    Squamous Epithelial Cells, UA <5 <=5 /HPF    Bacteria, UA None Seen None Seen, Rare, Occasional /HPF   Troponin I   Result Value Ref Range    Troponin-I <0.010 0.000 - 0.045 ng/mL   Troponin I   Result Value Ref Range    Troponin-I <0.010 0.000 - 0.045 ng/mL       Assessment:     1. Otalgia, unspecified laterality        Plan:   Mass seen in ear canal longer present.  It was likely wax.  Given that the eardrum is red, we will treat like an ear infection.  Medications sent to pharmacy.  Start Flonase twice a day.  Start Claritin daily.   Tylenol or Advil as needed for pain.  If there is no improvement in symptoms in 2 or 3 days notify our clinic    Otalgia, unspecified laterality  -     Ear wax removal    Other orders  -     amoxicillin-clavulanate 875-125mg (AUGMENTIN) 875-125 mg per tablet; Take 1 tablet by mouth every 12 (twelve) hours. for 10 days  Dispense: 20 tablet; Refill: 0  -     predniSONE (DELTASONE) 10 MG tablet; Take 3 tablets (30 mg total) by mouth once daily. for 5 days  Dispense: 15 tablet; Refill: 0